# Patient Record
Sex: MALE | Race: WHITE | NOT HISPANIC OR LATINO | Employment: FULL TIME | URBAN - METROPOLITAN AREA
[De-identification: names, ages, dates, MRNs, and addresses within clinical notes are randomized per-mention and may not be internally consistent; named-entity substitution may affect disease eponyms.]

---

## 2017-01-23 ENCOUNTER — OFFICE VISIT (OUTPATIENT)
Dept: MEDICAL GROUP | Facility: MEDICAL CENTER | Age: 47
End: 2017-01-23
Payer: COMMERCIAL

## 2017-01-23 VITALS
OXYGEN SATURATION: 99 % | DIASTOLIC BLOOD PRESSURE: 76 MMHG | SYSTOLIC BLOOD PRESSURE: 128 MMHG | HEART RATE: 60 BPM | BODY MASS INDEX: 27.43 KG/M2 | WEIGHT: 181 LBS | RESPIRATION RATE: 16 BRPM | HEIGHT: 68 IN | TEMPERATURE: 97.5 F

## 2017-01-23 DIAGNOSIS — I10 ESSENTIAL HYPERTENSION: ICD-10-CM

## 2017-01-23 DIAGNOSIS — B00.1 RECURRENT COLD SORES: ICD-10-CM

## 2017-01-23 DIAGNOSIS — M25.542 PAIN IN THUMB JOINT WITH MOVEMENT, LEFT: ICD-10-CM

## 2017-01-23 DIAGNOSIS — Z30.09 VASECTOMY EVALUATION: ICD-10-CM

## 2017-01-23 DIAGNOSIS — J45.20 MILD INTERMITTENT ASTHMA WITHOUT COMPLICATION: ICD-10-CM

## 2017-01-23 DIAGNOSIS — Z00.00 ANNUAL PHYSICAL EXAM: ICD-10-CM

## 2017-01-23 DIAGNOSIS — Z12.5 PROSTATE CANCER SCREENING: ICD-10-CM

## 2017-01-23 DIAGNOSIS — J30.2 SEASONAL ALLERGIC RHINITIS, UNSPECIFIED ALLERGIC RHINITIS TRIGGER: ICD-10-CM

## 2017-01-23 PROCEDURE — 99386 PREV VISIT NEW AGE 40-64: CPT | Performed by: NURSE PRACTITIONER

## 2017-01-23 RX ORDER — LISINOPRIL 20 MG/1
10 TABLET ORAL DAILY
Qty: 45 TAB | Refills: 1 | Status: SHIPPED | OUTPATIENT
Start: 2017-01-23 | End: 2017-07-14 | Stop reason: SDUPTHER

## 2017-01-23 RX ORDER — ACYCLOVIR 400 MG/1
TABLET ORAL
Qty: 60 TAB | Refills: 0 | Status: SHIPPED | OUTPATIENT
Start: 2017-01-23 | End: 2018-04-23

## 2017-01-23 RX ORDER — ALBUTEROL SULFATE 90 UG/1
2 AEROSOL, METERED RESPIRATORY (INHALATION) EVERY 6 HOURS PRN
Qty: 8.5 G | Refills: 3 | Status: SHIPPED | OUTPATIENT
Start: 2017-01-23 | End: 2018-04-23

## 2017-01-23 ASSESSMENT — PATIENT HEALTH QUESTIONNAIRE - PHQ9: CLINICAL INTERPRETATION OF PHQ2 SCORE: 0

## 2017-01-23 NOTE — MR AVS SNAPSHOT
"        Moise Obrien   2017 9:20 AM   Office Visit   MRN: [id number]    Department:  [facility]   Dept Phone:  [phone]    Description:  Male : 1970   Provider:  ANGELES Lay           Reason for Visit     Establish Care BP      Allergies as of 2017     No Known Allergies      You were diagnosed with     Essential hypertension   [7382429]       Lipid screening   [506398]       Vasectomy evaluation   [301922]       Prostate cancer screening   [867001]       Mild intermittent asthma without complication   [320039]       Recurrent cold sores   [090959]       Health care maintenance   [981791]       Seasonal allergic rhinitis, unspecified allergic rhinitis trigger   [5130866]       Vitamin D deficiency disease   [959247]       Annual physical exam   [513315]       Pain in thumb joint with movement, left   [2510651]         Vital Signs     Blood Pressure Pulse Temperature Respirations Height Weight    128/76 mmHg 60 36.4 °C (97.5 °F) 16 1.727 m (5' 8\") 82.101 kg (181 lb)    Body Mass Index Oxygen Saturation Smoking Status             27.53 kg/m2 99% Never Smoker          Basic Information     Date Of Birth Sex Race Ethnicity Preferred Language    1970 Male White Non- English      Problem List              ICD-10-CM Priority Class Noted - Resolved    Health examination of defined subpopulation Z02.89   10/3/2012 - Present    Essential hypertension I10   2017 - Present      Health Maintenance        Date Due Completion Dates    IMM DTaP/Tdap/Td Vaccine (1 - Tdap) 1989 ---    IMM INFLUENZA (1) 2016 ---            Current Immunizations     Tuberculin Skin Test 10/12/2012  2:45 PM, 10/4/2012  2:00 PM      Below and/or attached are the medications your provider expects you to take. Review all of your home medications and newly ordered medications with your provider and/or pharmacist. Follow medication instructions as directed by your provider and/or " pharmacist. Please keep your medication list with you and share with your provider. Update the information when medications are discontinued, doses are changed, or new medications (including over-the-counter products) are added; and carry medication information at all times in the event of emergency situations     Allergies:  No Known Allergies          Medications  Valid as of: January 23, 2017 -  9:56 AM    Generic Name Brand Name Tablet Size Instructions for use    Acyclovir (Tab) ZOVIRAX 400 MG 1 tab PO BID PRN for cold sores        Albuterol Sulfate (Aero Soln) albuterol 108 (90 BASE) MCG/ACT Inhale 2 Puffs by mouth every 6 hours as needed for Shortness of Breath.        Diclofenac Sodium (Gel) Diclofenac Sodium 1 % Apply 2-4 g to skin as directed 4 times a day as needed.        Lisinopril (Tab) PRINIVIL 20 MG Take 0.5 Tabs by mouth every day.        .                 Medicines prescribed today were sent to:     [id number] - [place] - [address] [place] [address]    Phone: [phone] Fax: [phone]    Open 24 Hours?: No      Medication refill instructions:       If your prescription bottle indicates you have medication refills left, it is not necessary to call your provider’s office. Please contact your pharmacy and they will refill your medication.    If your prescription bottle indicates you do not have any refills left, you may request refills at any time through one of the following ways: The online [employer] system (except Urgent Care), by calling your provider’s office, or by asking your pharmacy to contact your provider’s office with a refill request. Medication refills are processed only during regular business hours and may not be available until the next business day. Your provider may request additional information or to have a follow-up visit with you prior to refilling your medication.   *Please Note: Medication refills are assigned a new Rx number when refilled  electronically. Your pharmacy may indicate that no refills were authorized even though a new prescription for the same medication is available at the pharmacy. Please request the medicine by name with the pharmacy before contacting your provider for a refill.        Your To Do List     Future Labs/Procedures Complete By Expires    COMP METABOLIC PANEL  As directed 1/24/2018    LIPID PROFILE  As directed 1/24/2018    PROSTATE SPECIFIC AG SCREENING  As directed 1/24/2018    TESTOSTERONE SERUM  As directed 1/23/2018    VITAMIN D,25 HYDROXY  As directed 1/24/2018      Referral     A referral request has been sent to our patient care coordination department. Please allow 3-5 business days for us to process this request and contact you either by phone or mail. If you do not hear from us by the 5th business day, please call us at [phone].           [employer] Access Code: [email or web address]  Expires: 2/22/2017  9:14 AM    [employer]  A secure, online tool to manage your health information     [employer]’s [employer]® is a secure, online tool that connects you to your personalized health information from the privacy of your home -- day or night - making it very easy for you to manage your healthcare. Once the activation process is completed, you can even access your medical information using the [employer] chris, which is available for free in the Apple Chris store or Google Play store.     [employer] provides the following levels of access (as shown below):   My Chart Features   [facility] Doctor [facility]  Specialists [facility]  Urgent  Care Non-[facility]  Primary Care  Doctor   Email your healthcare team securely and privately 24/7 X X X    Manage appointments: schedule your next appointment; view details of past/upcoming appointments X      Request prescription refills. X      View recent personal medical records, including lab and immunizations X X X X   View health record, including health history, allergies, medications X X X  X   Read reports about your outpatient visits, procedures, consult and ER notes X X X X   See your discharge summary, which is a recap of your hospital and/or ER visit that includes your diagnosis, lab results, and care plan. X X       How to register for [employer]:  1. Go to  [email or web address].  2. Click on the Sign Up Now box, which takes you to the New Member Sign Up page. You will need to provide the following information:  a. Enter your [employer] Access Code exactly as it appears at the top of this page. (You will not need to use this code after you’ve completed the sign-up process. If you do not sign up before the expiration date, you must request a new code.)   b. Enter your date of birth.   c. Enter your home email address.   d. Click Submit, and follow the next screen’s instructions.  3. Create a K[employer]r ID. This will be your [employer] login ID and cannot be changed, so think of one that is secure and easy to remember.  4. Create a [employer] password. You can change your password at any time.  5. Enter your Password Reset Question and Answer. This can be used at a later time if you forget your password.   6. Enter your e-mail address. This allows you to receive e-mail notifications when new information is available in [employer].  7. Click Sign Up. You can now view your health information.    For assistance activating your [employer] account, call [phone]

## 2017-01-23 NOTE — ASSESSMENT & PLAN NOTE
Diagnosed about 6 years ago, had been on lisinopril 20 mg daily for several years but has been taking 1/2 tab daily for the last few weeks and readings have remained in normal range

## 2017-01-23 NOTE — PROGRESS NOTES
Chief Complaint   Patient presents with   • Establish Care     BP     Moise Obrien is a 46 y.o. male here to establish care and requesting annual well exam, he has not recently seen a primary care provider. 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 He is  and has 3 children   Has been on lisinopril 10 mg daily for hypertension for the last 6 years, blood pressure at global in the office and in home readings. No chest pain, chronic cough, dizziness   Diet is generally healthy, he runs regularly for exercise   He has history of cold sores treated with acyclovir as needed, would like refill today.  He uses an albuterol inhaler on occasion, wheeze tends to be triggered by allergies. He does have significant congestion, postnasal drainage. Takes Claritin and Nasacort on a regular basis without much benefit. He would like to see an allergy specialist  Would also like referral to dermatology for general skin check, no specific concerns  He is interested in getting a vasectomy and like to see a urologist as well  Over the last few months he has had pain in the left thumb joint. No specific injury, no joint swelling or redness    Current medicines (including changes today)  Current Outpatient Prescriptions   Medication Sig Dispense Refill   • albuterol 108 (90 BASE) MCG/ACT Aero Soln inhalation aerosol Inhale 2 Puffs by mouth every 6 hours as needed for Shortness of Breath. 8.5 g 3   • lisinopril (PRINIVIL) 20 MG Tab Take 0.5 Tabs by mouth every day. 45 Tab 1   • acyclovir (ZOVIRAX) 400 MG tablet 1 tab PO BID PRN for cold sores 60 Tab 0   • Diclofenac Sodium 1 % Gel Apply 2-4 g to skin as directed 4 times a day as needed. 1 Tube 3     No current facility-administered medications for this visit.     He  has a past medical history of Hypertension and Allergy. He also has no past medical history of Diabetes ([facility]).  He  has no past surgical history on file.  Social History   Substance Use  "Topics   • Smoking status: Never Smoker    • Smokeless tobacco: Never Used   • Alcohol Use: Yes     Social History     Social History Narrative     Family History   Problem Relation Age of Onset   • Hypertension Mother    • Hypertension Father    • Stroke Father    • Alcohol/Drug Father    • Diabetes Brother      Family Status   Relation Status Death Age   • Mother Alive    • Father  69   • Brother Alive    • Sister Alive    • Son Alive    • Son Alive    • Daughter Alive      ROS  Problems listed discussed above, all other systems reviewed and negative     Objective:     Blood pressure 128/76, pulse 60, temperature 36.4 °C (97.5 °F), resp. rate 16, height 1.727 m (5' 8\"), weight 82.101 kg (181 lb), SpO2 99 %. Body mass index is 27.53 kg/(m^2).  Physical Exam:  General: Alert, oriented in no acute distress.  Eye contact is good, speech is normal, affect calm  HEENT: perrl, Oral mucosa pink moist, no lesions. Nares patent. TMs gray with good landmarks bilaterally. No cervical or supraclavicular lymphadenopathy, no thyromegaly  Lungs: clear to auscultation bilaterally, good aeration, normal effort. No wheeze/ rhonchi/ rales.  CV: regular rate and rhythm, S1, S2. No murmur, no JVD, no edema. Pedal pulses 2 + bilaterally  Abdomen: soft, nontender, BS x4, No CVAT, no hepatosplenomegaly.  Ext: color normal, vascularity normal, temperature normal. No rash or lesions.  MS: no point tenderness over spine, no obvious deformity. No joint swelling or redness. Strength is 5/5 globally  Neuro: DTR 2+ bilaterally   Assessment and Plan:   The following treatment plan was discussed   1. Annual physical exam   normal physical exam today except as discussed below. Motion topics reviewed including healthy diet, regular exercise, slight weight loss recommended. Labs as listed below  LIPID PROFILE    COMP METABOLIC PANEL    REFERRAL TO DERMATOLOGY    VITAMIN D,25 HYDROXY    TESTOSTERONE SERUM   2. Essential hypertension   stable, " medication refill sent  COMP METABOLIC PANEL        lisinopril (PRINIVIL) 20 MG Tab   3. Mild intermittent asthma without complication   doing well with as needed use of albuterol    4. Recurrent cold sores   training with as needed use of acyclovir, refill sent  acyclovir (ZOVIRAX) 400 MG tablet   5. Seasonal allergic rhinitis, unspecified allergic rhinitis trigger   remains uncontrolled with Claritin, Nasacort. He's interested in allergy testing and possibly immunotherapy  REFERRAL TO ALLERGY   6. Pain in thumb joint with movement, left   no abnormality on exam. May try:  Diclofenac Sodium 1 % Gel   7. Prostate cancer screening  PROSTATE SPECIFIC AG SCREENING   8. Vasectomy evaluation  REFERRAL TO UROLOGY       Educated in proper administration of medication(s) ordered today including safety, possible SE, risks, benefits, rationale and alternatives to therapy.     Followup: annually and pending labs             Please note that this dictation was created using voice recognition software. I have worked with consultants from the vendor as well as technical experts from [facility] [employer] to optimize the interface. I have made every reasonable attempt to correct obvious errors, but I expect that there are errors of grammar and possibly content that I did not discover before finalizing the note.

## 2017-01-23 NOTE — Clinical Note
1/23/2017    Subject: Action Required to Complete [email or web address] Activation    Dear Moise Obrien,    Thank you for enrolling in [email or web address], a free online tool where you can schedule appointments, request prescription refills, view test results and more. To complete your [email or web address] activation, please follow the instructions below.     1. Visit [facility]     2. Click “Sign Up Now”    3. Enter activation code: [email or web address]  Expires: 2/22/2017  9:14 AM    4. Follow the instructions on screen to complete the quick, 3-step activation    Once you’ve completed your activation, you’re ready to view your medical record. Please remember, [email or web address] is not to be used for urgent needs. For medical emergencies, dial 911.    Benefits [employer]’s secure online tool allows you to manage your health information day or night. [email or web address] allows you to:    • Schedule and view appointments  • View test results  • Request prescription refills  • Message your healthcare provider  • Keep track of your family’s health  • Review immunization records  • View or download your Summary of Care document    Download the [email or web address][employer]com Chris   After you’ve created a login by following the steps above, you can download the [email or web address] chris for your smartphone for even quicker access. Simply visit the chris store and search “[email or web address].”    For questions or assistance with [email or web address], please call Customer Service at [phone].    Sincerely,  Customer Service Team

## 2017-04-20 ENCOUNTER — HOSPITAL ENCOUNTER (OUTPATIENT)
Dept: LAB | Facility: MEDICAL CENTER | Age: 47
End: 2017-04-20
Attending: NURSE PRACTITIONER
Payer: COMMERCIAL

## 2017-04-20 DIAGNOSIS — I10 ESSENTIAL HYPERTENSION: ICD-10-CM

## 2017-04-20 DIAGNOSIS — Z00.00 ANNUAL PHYSICAL EXAM: ICD-10-CM

## 2017-04-20 DIAGNOSIS — Z12.5 PROSTATE CANCER SCREENING: ICD-10-CM

## 2017-04-20 LAB
25(OH)D3 SERPL-MCNC: 21 NG/ML (ref 30–100)
PSA SERPL-MCNC: 1.07 NG/ML (ref 0–4)
TESTOST SERPL-MCNC: 347 NG/DL (ref 175–781)

## 2017-04-20 PROCEDURE — 84403 ASSAY OF TOTAL TESTOSTERONE: CPT

## 2017-04-20 PROCEDURE — 84153 ASSAY OF PSA TOTAL: CPT

## 2017-04-20 PROCEDURE — 80053 COMPREHEN METABOLIC PANEL: CPT

## 2017-04-20 PROCEDURE — 82306 VITAMIN D 25 HYDROXY: CPT

## 2017-04-20 PROCEDURE — 36415 COLL VENOUS BLD VENIPUNCTURE: CPT

## 2017-04-20 PROCEDURE — 80061 LIPID PANEL: CPT

## 2017-04-21 LAB
ALBUMIN SERPL BCP-MCNC: 4.8 G/DL (ref 3.2–4.9)
ALBUMIN/GLOB SERPL: 1.3 G/DL
ALP SERPL-CCNC: 62 U/L (ref 30–99)
ALT SERPL-CCNC: 33 U/L (ref 2–50)
ANION GAP SERPL CALC-SCNC: 9 MMOL/L (ref 0–11.9)
AST SERPL-CCNC: 30 U/L (ref 12–45)
BILIRUB SERPL-MCNC: 0.9 MG/DL (ref 0.1–1.5)
BUN SERPL-MCNC: 14 MG/DL (ref 8–22)
CALCIUM SERPL-MCNC: 10.1 MG/DL (ref 8.5–10.5)
CHLORIDE SERPL-SCNC: 102 MMOL/L (ref 96–112)
CHOLEST SERPL-MCNC: 213 MG/DL (ref 100–199)
CO2 SERPL-SCNC: 26 MMOL/L (ref 20–33)
CREAT SERPL-MCNC: 0.99 MG/DL (ref 0.5–1.4)
GFR SERPL CREATININE-BSD FRML MDRD: >60 ML/MIN/1.73 M 2
GLOBULIN SER CALC-MCNC: 3.6 G/DL (ref 1.9–3.5)
GLUCOSE SERPL-MCNC: 75 MG/DL (ref 65–99)
HDLC SERPL-MCNC: 53 MG/DL
LDLC SERPL CALC-MCNC: 147 MG/DL
POTASSIUM SERPL-SCNC: 3.7 MMOL/L (ref 3.6–5.5)
PROT SERPL-MCNC: 8.4 G/DL (ref 6–8.2)
SODIUM SERPL-SCNC: 137 MMOL/L (ref 135–145)
TRIGL SERPL-MCNC: 65 MG/DL (ref 0–149)

## 2017-07-14 DIAGNOSIS — I10 ESSENTIAL HYPERTENSION: ICD-10-CM

## 2017-07-14 RX ORDER — LISINOPRIL 20 MG/1
10 TABLET ORAL DAILY
Qty: 45 TAB | Refills: 1 | Status: SHIPPED | OUTPATIENT
Start: 2017-07-14 | End: 2018-01-10 | Stop reason: SDUPTHER

## 2018-01-10 DIAGNOSIS — I10 ESSENTIAL HYPERTENSION: ICD-10-CM

## 2018-01-10 RX ORDER — LISINOPRIL 20 MG/1
10 TABLET ORAL DAILY
Qty: 45 TAB | Refills: 0 | Status: SHIPPED | OUTPATIENT
Start: 2018-01-10 | End: 2018-04-23 | Stop reason: SDUPTHER

## 2018-03-12 ENCOUNTER — OFFICE VISIT (OUTPATIENT)
Dept: URGENT CARE | Facility: CLINIC | Age: 48
End: 2018-03-12
Payer: COMMERCIAL

## 2018-03-12 VITALS
OXYGEN SATURATION: 99 % | RESPIRATION RATE: 20 BRPM | SYSTOLIC BLOOD PRESSURE: 154 MMHG | HEART RATE: 63 BPM | DIASTOLIC BLOOD PRESSURE: 100 MMHG | HEIGHT: 68 IN | WEIGHT: 182.76 LBS | TEMPERATURE: 97.4 F | BODY MASS INDEX: 27.7 KG/M2

## 2018-03-12 DIAGNOSIS — R09.82 PND (POST-NASAL DRIP): ICD-10-CM

## 2018-03-12 DIAGNOSIS — H61.23 BILATERAL IMPACTED CERUMEN: ICD-10-CM

## 2018-03-12 DIAGNOSIS — J02.9 PHARYNGITIS, UNSPECIFIED ETIOLOGY: ICD-10-CM

## 2018-03-12 LAB
INT CON NEG: NORMAL
INT CON POS: NORMAL
S PYO AG THROAT QL: NEGATIVE

## 2018-03-12 PROCEDURE — 87880 STREP A ASSAY W/OPTIC: CPT | Performed by: PHYSICIAN ASSISTANT

## 2018-03-12 PROCEDURE — 69210 REMOVE IMPACTED EAR WAX UNI: CPT | Performed by: PHYSICIAN ASSISTANT

## 2018-03-12 PROCEDURE — 99214 OFFICE O/P EST MOD 30 MIN: CPT | Mod: 25 | Performed by: PHYSICIAN ASSISTANT

## 2018-03-12 RX ORDER — FLUTICASONE PROPIONATE 50 MCG
1 SPRAY, SUSPENSION (ML) NASAL DAILY
Qty: 16 G | Refills: 0 | Status: SHIPPED | OUTPATIENT
Start: 2018-03-12 | End: 2018-04-23

## 2018-03-12 ASSESSMENT — ENCOUNTER SYMPTOMS
CHILLS: 0
VOMITING: 0
CONSTIPATION: 0
FEVER: 0
HEARTBURN: 0
EYE DISCHARGE: 0
BLOOD IN STOOL: 0
SORE THROAT: 1
NAUSEA: 0
ABDOMINAL PAIN: 0
DIARRHEA: 0
PALPITATIONS: 0
SINUS PAIN: 0
SPUTUM PRODUCTION: 0
WHEEZING: 0
COUGH: 1
SHORTNESS OF BREATH: 0
MYALGIAS: 0
FLANK PAIN: 0
EYE REDNESS: 0

## 2018-03-12 NOTE — LETTER
March 12, 2018       Patient: Moise Obrien   YOB: 1970   Date of Visit: 3/12/2018         To Whom It May Concern:    It is my medical opinion that Moise Obrien should be excused from work for today and tomorrow due to illness.      If you have any questions or concerns, please don't hesitate to call [phone]          Sincerely,          Timo Salinas P.A.-C.  Electronically Signed

## 2018-03-12 NOTE — PROGRESS NOTES
"Subjective:   Moise Obrien is a 47 y.o. male who presents for Pharyngitis (3 days)        Pharyngitis    This is a new problem. The current episode started in the past 7 days (3d). Associated symptoms include congestion and coughing ( \"throat clearing cough\"). Pertinent negatives include no abdominal pain, diarrhea, ear pain, shortness of breath or vomiting.   Notes last 3d of ST, throat clearing cough, c/o some \"not feeling that good in stomach, c/o dyspepsia, denies nausae/vomitign/abdpain, denies pMH of belly surgery, denies conspation ordiarrhea, denies ear pain, denies sinus congestion, denies PMH of strep, has had it. Denies pMH of asthma/bronchitis/pneumonia, notes some asthma wheezy breathing w/ cleaning products, feels tighter, denies wheeze now. Notes ST all day, not worse any  Time of day, PMH of seasonal allerg - no tx now.   Review of Systems   Constitutional: Negative for chills and fever.   HENT: Positive for congestion and sore throat. Negative for ear pain and sinus pain.    Eyes: Negative for discharge and redness.   Respiratory: Positive for cough ( \"throat clearing cough\"). Negative for sputum production, shortness of breath and wheezing.    Cardiovascular: Negative for chest pain and palpitations.   Gastrointestinal: Negative for abdominal pain, blood in stool, constipation, diarrhea, heartburn, melena, nausea and vomiting.        Pos for mild dyspepsia     Genitourinary: Negative for flank pain and hematuria.   Musculoskeletal: Negative for myalgias.   Skin: Negative for rash.   Endo/Heme/Allergies: Positive for environmental allergies.     No Known Allergies   I have worn a mask for the entire encounter with this patient.      Objective:   /100   Pulse 63   Temp 36.3 °C (97.4 °F)   Resp 20   Ht 1.727 m (5' 8\")   Wt 82.9 kg (182 lb 12.2 oz)   SpO2 99%   BMI 27.79 kg/m²   Physical Exam   Constitutional: He is oriented to person, place, and time. He appears well-developed and " well-nourished. No distress.   HENT:   Head: Normocephalic and atraumatic.   Right Ear: External ear normal. No drainage.   Left Ear: External ear normal. No drainage.   Nose: Nose normal.   Mouth/Throat: Uvula is midline and mucous membranes are normal. Posterior oropharyngeal erythema ( mild PND) present. No oropharyngeal exudate, posterior oropharyngeal edema or tonsillar abscesses.   L>R cerumen impaction   Eyes: Conjunctivae are normal. Right eye exhibits no discharge. Left eye exhibits no discharge. No scleral icterus.   Neck: Neck supple.   Pulmonary/Chest: Effort normal. No respiratory distress. He has no decreased breath sounds. He has no wheezes. He has no rhonchi. He has no rales.   Musculoskeletal: Normal range of motion.   Lymphadenopathy:     He has cervical adenopathy ( mild bilat).   Neurological: He is alert and oriented to person, place, and time. Coordination normal.   Skin: Skin is warm and dry. He is not diaphoretic. No pallor.   Psychiatric: He has a normal mood and affect.   Nursing note and vitals reviewed.  POCT strep - NEG    Procedure: Cerumen Removal  Risks and benefits of procedure discussed  Cerumen removed with curette and lavage after softening agent instilled  Patient tolerated well  Post procedure exam with clear canals and normal TMs    Assessment/Plan:   Assessment    1. Pharyngitis, unspecified etiology  Supportive care is reviewed with patient/caregiver - recommend to push PO fluids and electrolytes, discussed apparent viral URI, tx allerg, Nsaids/tylenol, netti pot/saline irrig, humidifier in home, flonase, ponaris  Discussed home cerumen techniques, Return to clinic with lack of resolution or progression of symptoms.    Discussed call clinic w/ ear pain/edema/discharge over next few days and will call in drops for otitis externa (no s/sx today)    - POCT Rapid Strep A  - CULTURE THROAT; Future  - lidocaine viscous 2% (XYLOCAINE) 2 % Solution; Take 5 mL by mouth as needed for  Throat/Mouth Pain (q6hr PRN throat pain, ok to rinse and spit or swallow).  Dispense: 120 mL; Refill: 0    2. PND (post-nasal drip)    - fluticasone (FLONASE) 50 MCG/ACT nasal spray; Spray 1 Spray in nose every day.  Dispense: 16 g; Refill: 0  - lidocaine viscous 2% (XYLOCAINE) 2 % Solution; Take 5 mL by mouth as needed for Throat/Mouth Pain (q6hr PRN throat pain, ok to rinse and spit or swallow).  Dispense: 120 mL; Refill: 0    3. Bilateral impacted cerumen      Differential diagnosis, natural history, supportive care, and indications for immediate follow-up discussed.

## 2018-04-10 DIAGNOSIS — I10 ESSENTIAL HYPERTENSION: ICD-10-CM

## 2018-04-10 RX ORDER — LISINOPRIL 20 MG/1
TABLET ORAL
Refills: 0 | OUTPATIENT
Start: 2018-04-10

## 2018-04-19 ENCOUNTER — TELEPHONE (OUTPATIENT)
Dept: MEDICAL GROUP | Facility: MEDICAL CENTER | Age: 48
End: 2018-04-19

## 2018-04-19 NOTE — TELEPHONE ENCOUNTER
ESTABLISHED PATIENT PRE-VISIT PLANNING     Note: Patient will not be contacted if there is no indication to call.     1.  Reviewed notes from the last few office visits within the medical group: Yes 01/23/2017    2.  If any orders were placed at last visit or intended to be done for this visit (i.e. 6 mos follow-up), do we have Results/Consult Notes?        •  Labs - Labs were not ordered at last office visit.   Note: If patient appointment is for lab review and patient did not complete labs, check with provider if OK to reschedule patient until labs completed.       •  Imaging - Imaging was not ordered at last office visit.       •  Referrals - Referral comp     3. Is this appointment scheduled as a Hospital Follow-Up? No    4.  Immunizations were updated in [employer] using WebIZ?: Yes       •  Web [employer] Recommendations: FLU, HEPATITIS A , HEPATITIS B, MMR  and VARICELLA (Chicken Pox)     5.  Patient is due for the following Health Maintenance Topics:   Health Maintenance Due   Topic Date Due   • IMM DTaP/Tdap/Td Vaccine (1 - Tdap) 08/19/1989       6.  MDX printed for Provider? NO    7.  Patient was NOT informed to arrive 15 min prior to their scheduled appointment and bring in their medication bottles.

## 2018-04-23 ENCOUNTER — OFFICE VISIT (OUTPATIENT)
Dept: MEDICAL GROUP | Facility: MEDICAL CENTER | Age: 48
End: 2018-04-23
Payer: COMMERCIAL

## 2018-04-23 VITALS
DIASTOLIC BLOOD PRESSURE: 78 MMHG | WEIGHT: 178 LBS | BODY MASS INDEX: 26.98 KG/M2 | SYSTOLIC BLOOD PRESSURE: 138 MMHG | HEART RATE: 63 BPM | OXYGEN SATURATION: 95 % | TEMPERATURE: 98.4 F | HEIGHT: 68 IN

## 2018-04-23 DIAGNOSIS — Z00.00 PREVENTATIVE HEALTH CARE: ICD-10-CM

## 2018-04-23 DIAGNOSIS — I10 ESSENTIAL HYPERTENSION: ICD-10-CM

## 2018-04-23 DIAGNOSIS — E55.9 HYPOVITAMINOSIS D: ICD-10-CM

## 2018-04-23 PROCEDURE — 99214 OFFICE O/P EST MOD 30 MIN: CPT | Performed by: PHYSICIAN ASSISTANT

## 2018-04-23 RX ORDER — LISINOPRIL 10 MG/1
10 TABLET ORAL DAILY
Qty: 90 TAB | Refills: 3 | Status: SHIPPED | OUTPATIENT
Start: 2018-04-23 | End: 2019-04-10 | Stop reason: SDUPTHER

## 2018-04-23 ASSESSMENT — PATIENT HEALTH QUESTIONNAIRE - PHQ9: CLINICAL INTERPRETATION OF PHQ2 SCORE: 0

## 2018-04-23 NOTE — ASSESSMENT & PLAN NOTE
This is a 47-year-old male who is here today to discuss his blood pressure. He ran out of lisinopril. Is requested to be seen. At one point was taking 20 mg but now cuts the tablets in half. Take 10 mg daily. 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 He [place] lives with 2 roommates in a home. He is  with 3 younger children. Wants to try to make the change in living [place] just for the job. He thinks it'll be easier on himself as well as his family. He exercises routinely. Eats healthy.

## 2018-04-23 NOTE — ASSESSMENT & PLAN NOTE
In the past his vitamin D level was low in the 20s. Taking 1000 µg daily caused his vitamin D level to be over 200. His creatinine level as well became elevated.

## 2018-04-23 NOTE — PROGRESS NOTES
Subjective:   Moise Obrien is a 47 y.o. male here today for hypertension and hypovitaminosis D.    Essential hypertension  This is a 47-year-old male who is here today to discuss his blood pressure. He ran out of lisinopril. Is requested to be seen. At one point was taking 20 mg but now cuts the tablets in half. Take 10 mg daily. 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 He [place] lives with 2 roommates in a home. He is  with 3 younger children. Wants to try to make the change in living [place] just for the job. He thinks it'll be easier on himself as well as his family. He exercises routinely. Eats healthy.    Hypovitaminosis D  In the past his vitamin D level was low in the 20s. Taking 1000 µg daily caused his vitamin D level to be over 200. His creatinine level as well became elevated.         Current medicines (including changes today)  Current Outpatient Prescriptions   Medication Sig Dispense Refill   • lisinopril (PRINIVIL) 10 MG Tab Take 1 Tab by mouth every day. 90 Tab 3     No current facility-administered medications for this visit.      He  has a past medical history of Allergy and Hypertension. He also has no past medical history of Diabetes ([facility]).    Social History and Family History were reviewed and updated.    ROS   No chest pain, no shortness of breath, no abdominal pain and all other systems were reviewed and are negative.       Objective:     There were no vitals taken for this visit. There is no height or weight on file to calculate BMI.   Physical Exam:  Constitutional: Alert, no distress.  Skin: Warm, dry, good turgor, no rashes in visible areas.  Eye: Equal, round and reactive, conjunctiva clear, lids normal.  ENMT: Lips without lesions, good dentition, oropharynx clear.  Neck: Trachea midline, no masses.   Lymph: No cervical or supraclavicular lymphadenopathy  Respiratory: Unlabored respiratory effort, lungs clear to auscultation, no wheezes, no  monserratmichael.  Cardiovascular: Normal S1, S2, no murmur, no edema.  Psych: Alert and oriented x3, normal affect and mood.        Assessment and Plan:   The following treatment plan was discussed    1. Essential hypertension  Chronic condition. Controlled. Renewed lisinopril 10 mg daily. Order lipid panel and CBC and CMP.  - LIPID PROFILE; Future  - COMP METABOLIC PANEL; Future  - lisinopril (PRINIVIL) 10 MG Tab; Take 1 Tab by mouth every day.  Dispense: 90 Tab; Refill: 3    2. Hypovitaminosis D  Chronic condition. Status unknown. Vitamin D lab ordered. Advised potentially to take an over-the-counter vitamin D supplement and a multivitamin.    3. Preventative health care  Ordered labs fasting. He will be contacted with the results.  - LIPID PROFILE; Future  - COMP METABOLIC PANEL; Future  - VITAMIN D,25 HYDROXY; Future      Followup: No Follow-up on file.    Please note that this dictation was created using voice recognition software. I have made every reasonable attempt to correct obvious errors, but I expect that there are errors of grammar and possibly content that I did not discover before finalizing the note.

## 2019-01-14 DIAGNOSIS — I10 ESSENTIAL HYPERTENSION: ICD-10-CM

## 2019-01-14 NOTE — TELEPHONE ENCOUNTER
Please advise patient refill has been sent.  I have not seen him since January 2017, he needs to be seen once a year to continue medication refills

## 2019-02-04 ENCOUNTER — APPOINTMENT (OUTPATIENT)
Dept: MEDICAL GROUP | Facility: MEDICAL CENTER | Age: 49
End: 2019-02-04
Payer: COMMERCIAL

## 2019-04-10 DIAGNOSIS — I10 ESSENTIAL HYPERTENSION: ICD-10-CM

## 2019-04-10 RX ORDER — LISINOPRIL 10 MG/1
10 TABLET ORAL DAILY
Qty: 90 TAB | Refills: 0 | Status: SHIPPED | OUTPATIENT
Start: 2019-04-10

## 2019-07-11 DIAGNOSIS — I10 ESSENTIAL HYPERTENSION: ICD-10-CM

## 2019-07-11 RX ORDER — LISINOPRIL 10 MG/1
TABLET ORAL
Qty: 90 TAB | OUTPATIENT
Start: 2019-07-11

## 2019-07-25 DIAGNOSIS — I10 ESSENTIAL HYPERTENSION: ICD-10-CM

## 2019-07-25 RX ORDER — LISINOPRIL 10 MG/1
TABLET ORAL
Qty: 90 TAB | OUTPATIENT
Start: 2019-07-25